# Patient Record
Sex: FEMALE
[De-identification: names, ages, dates, MRNs, and addresses within clinical notes are randomized per-mention and may not be internally consistent; named-entity substitution may affect disease eponyms.]

---

## 2022-05-07 ENCOUNTER — NURSE TRIAGE (OUTPATIENT)
Dept: OTHER | Facility: CLINIC | Age: 87
End: 2022-05-07

## 2022-05-07 NOTE — TELEPHONE ENCOUNTER
Subjective: Caller states Lawanda Capellan was in the ED Wednesday morning and she is still sick\"     Current Symptoms: diarrhea watery, vomiting    Onset: 5 days ago; unchanged    Associated Symptoms: NA    Pain Severity: abdominal pain    Temperature: denies    What has been tried: NA    LMP: NA Pregnant: NA    Recommended disposition: Go to ED Now    Care advice provided, patient verbalizes understanding; denies any other questions or concerns; instructed to call back for any new or worsening symptoms. Patient/caller agrees to proceed to . Emergency Department  This triage is a result of a call to [address] [place], [place] [id number] Call a Nurse. Please do not respond to the triage nurse through this encounter. Any subsequent communication should be directly with the patient.       Reason for Disposition   [1] Drinking very little AND [2] dehydration suspected (e.g., no urine > 12 hours, very dry mouth, very lightheaded)    Protocols used: DIARRHEA-ADULT-